# Patient Record
Sex: FEMALE | Race: WHITE
[De-identification: names, ages, dates, MRNs, and addresses within clinical notes are randomized per-mention and may not be internally consistent; named-entity substitution may affect disease eponyms.]

---

## 2020-06-20 ENCOUNTER — HOSPITAL ENCOUNTER (EMERGENCY)
Dept: HOSPITAL 52 - LL.ED | Age: 50
Discharge: HOME | End: 2020-06-20
Payer: COMMERCIAL

## 2020-06-20 DIAGNOSIS — E66.9: ICD-10-CM

## 2020-06-20 DIAGNOSIS — I10: ICD-10-CM

## 2020-06-20 DIAGNOSIS — M54.41: Primary | ICD-10-CM

## 2020-06-20 DIAGNOSIS — Z79.899: ICD-10-CM

## 2020-06-20 NOTE — EDM.PDOC
ED HPI GENERAL MEDICAL PROBLEM





- General


Chief Complaint: General


Stated Complaint: R HIP PAIN


Time Seen by Provider: 06/20/20 22:00


Source of Information: Reports: Patient


History Limitations: Reports: No Limitations





- History of Present Illness


INITIAL COMMENTS - FREE TEXT/NARRATIVE: 





Right hip pain present for last 2 weeks.  Has been on feet more at work, but 

denies any other change in activity or injury. 


Sometimes radiates down proximal hip.  No numbness or tingling.  No leg 

weakness. No history of similar pain in past. Movement makes it worse.  Trying 

to get out of bed in mornings causes pain. Taking OTC meds but no help. Pain 

overall is getting worse. 


Treatments PTA: Reports: Other (see below)


Other Treatments PTA: aleve at 1930, bio shaniqua


  ** Right Hip


Pain Score (Numeric/FACES): 8





- Related Data


                                    Allergies











Allergy/AdvReac Type Severity Reaction Status Date / Time


 


No Known Allergies Allergy   Verified 06/20/20 21:33











Home Meds: 


                                    Home Meds





Acetaminophen [Tylenol Extra Strength] 1,000 mg PO Q6H PRN 06/20/20 [History]


Ibuprofen [Advil] 400 mg PO Q6H PRN 06/20/20 [History]


Menthol [Biofreeze] 1 applic TP Q4H PRN 06/20/20 [History]


Naproxen Sodium [Aleve] 220 mg PO BID PRN 06/20/20 [History]











Past Medical History


HEENT History: Reports: Impaired Vision


OB/GYN History: Reports: Other (See Below)


Other OB/GYN History: D& C after childbirth


Endocrine/Metabolic History: Reports: Obesity/BMI 30+


Dermatologic History: Reports: None





- Past Surgical History


Musculoskeletal Surgical History: Reports: Other (See Below)


Other Musculoskeletal Surgeries/Procedures:: bunionectomy, bilat.





Social & Family History





- Tobacco Use


Smoking Status *Q: Never Smoker


Second Hand Smoke Exposure: No





- Caffeine Use


Caffeine Use: Reports: Coffee





- Recreational Drug Use


Recreational Drug Use: No





ED ROS GENERAL





- Review of Systems


Review Of Systems: See Below


Constitutional: Reports: No Symptoms.  Denies: Fever, Chills


HEENT: Reports: Other (no acute changes)


Respiratory: Reports: No Symptoms


Cardiovascular: Reports: No Symptoms


GI/Abdominal: Reports: No Symptoms


: Reports: No Symptoms


Musculoskeletal: Reports: Other (right hip pain)


Skin: Reports: No Symptoms


Neurological: Reports: Difficulty Walking (right hip pain), Gait Disturbance 

(right hip pain).  Denies: Dizziness, Numbness, Paresthesia, Tingling





ED EXAM, GENERAL





- Physical Exam


Exam: See Below


Exam Limited By: No Limitations


General Appearance: Alert, Obese, Other (intermittent complaint of pain during 

exam during certain movements. )


Eye Exam: Bilateral Eye: EOMI, PERRL


Ears: Hearing Grossly Normal


Nose: No: Nasal Deformity, Nasal Swelling, Nasal Drainage


Throat/Mouth: Normal Voice, No Airway Compromise


Head: Atraumatic, Normocephalic


Neck: Supple


Respiratory/Chest: No Respiratory Distress, Lungs Clear, Normal Breath Sounds, 

No Accessory Muscle Use, Chest Non-Tender


Cardiovascular: Regular Rate, Rhythm, No Murmur


GI/Abdominal: Normal Bowel Sounds, Soft, Non-Tender, No Distention


 (Female) Exam: Deferred


Rectal (Female) Exam: Deferred


Back Exam: Decreased Range of Motion (due to right low back/right hip pain).  

No: CVA Tenderness (L), CVA Tenderness (R), Paraspinal Tenderness, Vertebral 

Tenderness


Extremities: Normal Capillary Refill, Limited Range of Motion (right leg/patient

 cannot lift it without low back pain.  Negative straight leg raise with passive

 ROM bilateral legs. Has tenderness in sciatic notch with palpation that 

reproduces patient's pain complaint. )


Neurological: Alert, Oriented, Normal Cognition, Normal Reflexes, No 

Motor/Sensory Deficits


Psychiatric: Normal Affect, Normal Mood


Skin Exam: Warm, Dry, Intact, Normal Color, No Rash





Course





- Vital Signs


Last Recorded V/S: 





                                Last Vital Signs











Temp  36.8 C   06/20/20 21:26


 


Pulse  74   06/20/20 21:26


 


Resp  16   06/20/20 21:26


 


BP  162/88 H  06/20/20 21:35


 


Pulse Ox  99   06/20/20 21:26














- Orders/Labs/Meds


Orders: 





                               Active Orders 24 hr











 Category Date Time Status


 


 Hip Min 2V or 3V Rt [CR] Stat Exams  06/20/20 21:43 Taken


 


 Lumbar Spine 2 or 3V [CR] Stat Exams  06/20/20 21:44 Taken











Meds: 





Medications














Discontinued Medications














Generic Name Dose Route Start Last Admin





  Trade Name Freq  PRN Reason Stop Dose Admin


 


Ketorolac Tromethamine  60 mg  06/20/20 22:33 





  Toradol  IM  06/20/20 22:34 





  ONETIME ONE  


 


Methylprednisolone Sodium Succinate  125 mg  06/20/20 22:32 





  Solu-Medrol  IM  06/20/20 22:33 





  ONETIME ONE  














- Re-Assessments/Exams


Free Text/Narrative Re-Assessment/Exam: 





Xray of right hip and lumbar spine showed no acute changes or significant 

degenerative changes.  Abnormality of T10 noted that is of unknown significance.

  No pain complaint/tenderness in that area.  Pending formal Radiology review. 


Will treat for sciatica/low back pain exacerbation.  Single dose of Toradol and 

Solu-medrol given to help with pain and swelling.  Send home bottles of Tramadol

 (10) and Flexeril for use for spasm and pain.  Cautions reviewed with patient 

concerning side effects of meds and not to take extra doses.  


Recommend follow up to establish a primary care provider for routine 

physical/recheck BP and assess for HTN/and re-evaluated right sciatic complaint.

  Patient may benefit from referral to PT and may require additional imaging if 

pain does not improve. 





Departure





- Departure


Time of Disposition: 22:55


Disposition: Home, Self-Care 01


Condition: Good


Clinical Impression: 


Right-sided low back pain with sciatica


Qualifiers:


 Chronicity: acute Sciatica laterality: sciatica of right side Qualified 

Code(s): M54.41 - Lumbago with sciatica, right side





HTN (hypertension)


Qualifiers:


 Hypertension type: essential hypertension Qualified Code(s): I10 - Essential 

(primary) hypertension








- Discharge Information


*PRESCRIPTION DRUG MONITORING PROGRAM REVIEWED*: Not Applicable


*COPY OF PRESCRIPTION DRUG MONITORING REPORT IN PATIENT JOVANNY: Not Applicable


Instructions:  Sciatica, Hypertension, Adult, Easy-to-Read


Referrals: 


PCP,None [Primary Care Provider] - 


Additional Instructions: 


Take Flexeril for muscle spasms, and Tramadol for pain.  OK to try CBD oil.  

Follow up with a local primary care provider for recheck in the next week if 

pain does not improve significantly.  You may benefit from referral to PT as we 

discussed.  You may need additional imaging to get a better look at the low 

back.  You also need to get a basic physical and have some screening labs to 

make certain things appear to be functioning well, such as your kidneys, and 

that blood sugar is in normal range.  You blood pressure was high tonight and 

that also needs to be rechecked.  If it is not in normal range, you can discuss 

diet/possible meds to help treat it. 


Follow up as needed otherwise if you have worsening problems. 





Sepsis Event Note (ED)





- Evaluation


Sepsis Screening Result: No Definite Risk





- Focused Exam


Vital Signs: 





                                   Vital Signs











  Temp Pulse Resp BP Pulse Ox


 


 06/20/20 21:35     162/88 H 


 


 06/20/20 21:26  36.8 C  74  16  183/88 H  99














- My Orders


Last 24 Hours: 





My Active Orders





06/20/20 21:43


Hip Min 2V or 3V Rt [CR] Stat 





06/20/20 21:44


Lumbar Spine 2 or 3V [CR] Stat 














- Assessment/Plan


Last 24 Hours: 





My Active Orders





06/20/20 21:43


Hip Min 2V or 3V Rt [CR] Stat 





06/20/20 21:44


Lumbar Spine 2 or 3V [CR] Stat